# Patient Record
Sex: FEMALE | URBAN - METROPOLITAN AREA
[De-identification: names, ages, dates, MRNs, and addresses within clinical notes are randomized per-mention and may not be internally consistent; named-entity substitution may affect disease eponyms.]

---

## 2023-11-06 ENCOUNTER — APPOINTMENT (OUTPATIENT)
Dept: URBAN - METROPOLITAN AREA SURGERY 11 | Age: 63
Setting detail: DERMATOLOGY
End: 2023-11-06

## 2023-11-06 PROBLEM — C44.92 SQUAMOUS CELL CARCINOMA OF SKIN, UNSPECIFIED: Status: ACTIVE | Noted: 2023-11-06

## 2023-11-06 PROCEDURE — OTHER MOHS SURGERY PHONE CONSULTATION: OTHER

## 2023-11-13 ENCOUNTER — APPOINTMENT (OUTPATIENT)
Dept: URBAN - METROPOLITAN AREA SURGERY 11 | Age: 63
Setting detail: DERMATOLOGY
End: 2023-11-13

## 2023-11-13 PROBLEM — C44.729 SQUAMOUS CELL CARCINOMA OF SKIN OF LEFT LOWER LIMB, INCLUDING HIP: Status: ACTIVE | Noted: 2023-11-13

## 2023-11-13 PROCEDURE — 13121 CMPLX RPR S/A/L 2.6-7.5 CM: CPT

## 2023-11-13 PROCEDURE — 17313 MOHS 1 STAGE T/A/L: CPT

## 2023-11-13 PROCEDURE — OTHER MOHS SURGERY: OTHER

## 2023-11-13 NOTE — PROCEDURE: MOHS SURGERY
Date Of Previous Biopsy (Optional): [id number]. 10/24/2023 Date Of Previous Biopsy (Optional): [id number]. 10/24/2023

## 2023-11-15 ENCOUNTER — RX ONLY (RX ONLY)
Age: 63
End: 2023-11-15

## 2023-11-15 RX ORDER — DOXYCYCLINE HYCLATE 100 MG/1
CAPSULE, GELATIN COATED ORAL
Qty: 14 | Refills: 0 | Status: ERX | COMMUNITY
Start: 2023-11-15